# Patient Record
Sex: FEMALE | Race: BLACK OR AFRICAN AMERICAN | NOT HISPANIC OR LATINO
[De-identification: names, ages, dates, MRNs, and addresses within clinical notes are randomized per-mention and may not be internally consistent; named-entity substitution may affect disease eponyms.]

---

## 2022-04-06 ENCOUNTER — APPOINTMENT (OUTPATIENT)
Dept: ORTHOPEDIC SURGERY | Facility: CLINIC | Age: 52
End: 2022-04-06
Payer: OTHER MISCELLANEOUS

## 2022-04-06 VITALS — WEIGHT: 160 LBS | HEIGHT: 64 IN | BODY MASS INDEX: 27.31 KG/M2

## 2022-04-06 PROBLEM — Z00.00 ENCOUNTER FOR PREVENTIVE HEALTH EXAMINATION: Status: ACTIVE | Noted: 2022-04-06

## 2022-04-06 PROCEDURE — 99213 OFFICE O/P EST LOW 20 MIN: CPT

## 2022-04-06 PROCEDURE — 99072 ADDL SUPL MATRL&STAF TM PHE: CPT
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
## 2022-04-06 NOTE — WORK
[Total] : total [Does not reveal pre-existing condition(s) that may affect treatment/prognosis] : does not reveal pre-existing condition(s) that may affect treatment/prognosis [Cannot return to work because ________] : cannot return to work because [unfilled] [15+ days] : 15+ days [Patient] : patient [No Rx restrictions] : No Rx restrictions. [I provided the services listed above] :  I provided the services listed above.

## 2022-04-06 NOTE — REASON FOR VISIT
[FreeTextEntry2] : pt is here for wc fu of the left shoulder. the pain in the left shoulder comes and goes. pt is doing pt which has been helping a lot. pt has  better  range of motion in the left shoulder  than before.

## 2022-04-06 NOTE — PHYSICAL EXAM
[Left] : left shoulder [4 ___] : forward flexion 4[unfilled]/5 [4___] : external rotation 4[unfilled]/5 [] : motor and sensory intact distally [FreeTextEntry9] : FE: L 140A, 150P\par ER: L 30\par IR: L L1

## 2022-04-06 NOTE — ASSESSMENT
[FreeTextEntry1] : L adhesive capsulitis with mild GH DJD.\par MRI L shoulder - supra tendonitis, no RCT\par Continue PT, HEP, she is improving. \par Mobic prn.\par Will consider GH injection.\par OOW.\par Follow up in 6 weeks.

## 2022-04-19 ENCOUNTER — APPOINTMENT (OUTPATIENT)
Dept: ORTHOPEDIC SURGERY | Facility: CLINIC | Age: 52
End: 2022-04-19
Payer: OTHER MISCELLANEOUS

## 2022-04-19 VITALS — BODY MASS INDEX: 27.31 KG/M2 | HEIGHT: 64 IN | WEIGHT: 160 LBS

## 2022-04-19 DIAGNOSIS — Z78.9 OTHER SPECIFIED HEALTH STATUS: ICD-10-CM

## 2022-04-19 PROCEDURE — 99214 OFFICE O/P EST MOD 30 MIN: CPT

## 2022-04-19 PROCEDURE — 99072 ADDL SUPL MATRL&STAF TM PHE: CPT

## 2022-04-19 NOTE — ASSESSMENT
[FreeTextEntry1] : Has limited L shoulder ROM and pain on exam, will continue with Dr. Perez for treatment.\par Does have some radicular symptoms cervical as well, Consult with Pain for Sharita and LESI.\par Will continue formal PT x 6 weeks. \par Pt will continue PRN Diclofenac bid for discomfort.\par pt is oow until further notice.\par rtc 2 months
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
## 2022-04-19 NOTE — IMAGING
[de-identified] : Left Shoulder: Palpation of the shoulder/upper arm is as follows: no tenderness to palpation. Strength of the shoulder is as follows: There is decent strength.. Ligament Stability and Special Tests of the shoulder is as follows: Impingement testing is positive. \par \par Neck: \par \par Inspection of the cervical spine is as follows: no scars, no erythema, no ecchymosis, no masses and no rashes. Palpation of the cervical spine is as follows: no trapezial spasm, no trapezial tenderness, no rhomboid spasm, no rhomboid tenderness, no paracervical spasm and no paracervical tenderness. Range of motion of the cervical spine is as follows: ROM is limited in extension Strength Testing for the cervical spine is as follows: Left Deltoid strength 5-/5. Right Deltoid strength 5/5, Left Biceps 5/5, Right Biceps 5/5, Left Triceps 5/5, Right Triceps 5/5, Left Wrist Flexors 5/5, Right Wrist Flexors 5/5, Left Finger Abductors 5/5, Right Finger Abductors 5/5, Left Grasp 5/5 and Right Grasp 5/5 Neurological testing for the cervical spine is as follows: light touch is intact throughout both upper extremities \par \par Back, including spine: Palpation of the thoracic/lumbar spine is as follows: no lumbar paraspinal spasm and no lumbar paraspinal tenderness. Range of motion of the thoracic and lumbar spine is as follows: full range of motion with mild stiffness. Strength Testing of the thoracic and lumbar spine is as follows: motor exam is non-focal throughout both lower extremities Special testing of the thoracic and lumbar spine is as follows: negative sitting straight leg raise on right and negative sitting straight leg raise on left Neurological testing of the thoracic and lumbar spine is as follows: sensory exam is non-focal throughout both lower extremities Gait and function is as follows: non-antalgic gait and patient ambulates without assistive device. Pt with normal tandem, heel and toe walking.  \par \par General: \par \par The percentage of impairment is total. Prognosis for recovery is guarded The patients PMHx does not reveal pre-existing condition(s) that may affect treatment/prognosis. The patient cannot return to work. Limitations include sitting. The patients return to work/limitations will be discussed with with patient. No Rx restrictions. Board authorized health care provider. I provided the services listed above.

## 2022-05-18 ENCOUNTER — APPOINTMENT (OUTPATIENT)
Dept: ORTHOPEDIC SURGERY | Facility: CLINIC | Age: 52
End: 2022-05-18
Payer: OTHER MISCELLANEOUS

## 2022-05-18 VITALS — HEIGHT: 64 IN | WEIGHT: 160 LBS | BODY MASS INDEX: 27.31 KG/M2

## 2022-05-18 PROCEDURE — 99072 ADDL SUPL MATRL&STAF TM PHE: CPT

## 2022-05-18 PROCEDURE — 99213 OFFICE O/P EST LOW 20 MIN: CPT
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
## 2022-05-18 NOTE — PHYSICAL EXAM
[Left] : left shoulder [4 ___] : forward flexion 4[unfilled]/5 [4___] : external rotation 4[unfilled]/5 [] : no deformity [FreeTextEntry9] : FE: L 140A, 150P\par ER: L 30\par IR: L L1

## 2022-05-18 NOTE — ASSESSMENT
[FreeTextEntry1] : L adhesive capsulitis with mild GH DJD.\par MRI L shoulder - supra tendonitis, no RCT.\par Continue PT, HEP,which is helping.  \par Mobic prn.\par Will consider GH injection.\par Dr. Costa has her OOW.\par Follow up in 6 weeks.

## 2022-05-31 ENCOUNTER — APPOINTMENT (OUTPATIENT)
Dept: ORTHOPEDIC SURGERY | Facility: CLINIC | Age: 52
End: 2022-05-31
Payer: OTHER MISCELLANEOUS

## 2022-05-31 VITALS — HEIGHT: 64 IN | BODY MASS INDEX: 27.31 KG/M2 | WEIGHT: 160 LBS

## 2022-05-31 PROCEDURE — 99213 OFFICE O/P EST LOW 20 MIN: CPT

## 2022-05-31 PROCEDURE — 99072 ADDL SUPL MATRL&STAF TM PHE: CPT

## 2022-05-31 NOTE — IMAGING
[de-identified] : Left Shoulder: Palpation of the shoulder/upper arm is as follows: no tenderness to palpation. Strength of the shoulder is as follows: There is decent strength.. Ligament Stability and Special Tests of the shoulder is as follows: Impingement testing is positive. \par \par Neck: \par \par Inspection of the cervical spine is as follows: no scars, no erythema, no ecchymosis, no masses and no rashes. Palpation of the cervical spine is as follows: no trapezial spasm, no trapezial tenderness, no rhomboid spasm, no rhomboid tenderness, no paracervical spasm and no paracervical tenderness. Range of motion of the cervical spine is as follows: ROM is full with stiffness\par Strength Testing for the cervical spine is as follows: Left Deltoid strength 5-/5. Right Deltoid strength 5/5, Left Biceps 5/5, Right Biceps 5/5, Left Triceps 5/5, Right Triceps 5/5, Left Wrist Flexors 5/5, Right Wrist Flexors 5/5, Left Finger Abductors 5/5, Right Finger Abductors 5/5, Left Grasp 5/5 and Right Grasp 5/5 Neurological testing for the cervical spine is as follows: light touch is intact throughout both upper extremities \par \par Back, including spine: Palpation of the thoracic/lumbar spine is as follows: no lumbar paraspinal spasm and no lumbar paraspinal tenderness. Range of motion of the thoracic and lumbar spine is as follows: full range of motion with mild stiffness. Strength Testing of the thoracic and lumbar spine is as follows: motor exam is non-focal throughout both lower extremities Special testing of the thoracic and lumbar spine is as follows: negative sitting straight leg raise on right and negative sitting straight leg raise on left Neurological testing of the thoracic and lumbar spine is as follows: sensory exam is non-focal throughout both lower extremities Gait and function is as follows: non-antalgic gait and patient ambulates without assistive device. Pt with normal tandem, heel and toe walking.  \par \par General: \par \par The percentage of impairment is total. Prognosis for recovery is guarded The patients PMHx does not reveal pre-existing condition(s) that may affect treatment/prognosis. The patient cannot return to work. Limitations include sitting. The patients return to work/limitations will be discussed with with patient. No Rx restrictions. Board authorized health care provider. I provided the services listed above.
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
## 2022-05-31 NOTE — ASSESSMENT
[FreeTextEntry1] : Has limited L shoulder ROM and pain on exam, will continue with Dr. Perez for treatment.\par Does have some radicular symptoms cervical as well, Consult with Pain for Sharita and LESI.\par Will continue formal PT x 6 weeks. \par Pt will continue PRN Diclofenac bid for discomfort.\par pt is oow until further notice.\par rtc 2 months\par \par NSAIDs- Patient warned of risk of medication to GI tract, increased blood pressure, cardiac risk, and risk of fluid retention.  Advised to clear medication with internist or PCP if any concurrent health problem with heart, blood pressure, or GI system exists.

## 2022-07-13 ENCOUNTER — APPOINTMENT (OUTPATIENT)
Dept: ORTHOPEDIC SURGERY | Facility: CLINIC | Age: 52
End: 2022-07-13

## 2022-07-13 VITALS — BODY MASS INDEX: 27.31 KG/M2 | HEIGHT: 64 IN | WEIGHT: 160 LBS

## 2022-07-13 PROCEDURE — 99072 ADDL SUPL MATRL&STAF TM PHE: CPT

## 2022-07-13 PROCEDURE — 99213 OFFICE O/P EST LOW 20 MIN: CPT

## 2022-07-13 RX ORDER — FLUTICASONE PROPIONATE 50 UG/1
50 SPRAY, METERED NASAL
Qty: 16 | Refills: 0 | Status: ACTIVE | COMMUNITY
Start: 2022-03-09

## 2022-07-13 RX ORDER — AMOXICILLIN 500 MG/1
500 CAPSULE ORAL
Qty: 30 | Refills: 0 | Status: ACTIVE | COMMUNITY
Start: 2022-02-09

## 2022-07-13 NOTE — PHYSICAL EXAM
[Left] : left shoulder [4 ___] : forward flexion 4[unfilled]/5 [5___] : external rotation 5[unfilled]/5 [] : no deformity [FreeTextEntry9] : FE: L 140A, 150P\par ER: L 30\par IR: L L1
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
## 2022-07-13 NOTE — WORK
[Total] : total [Does not reveal pre-existing condition(s) that may affect treatment/prognosis] : does not reveal pre-existing condition(s) that may affect treatment/prognosis [Cannot return to work because ________] : cannot return to work because [unfilled] [15+ days] : 15+ days [Patient] : patient [No Rx restrictions] : No Rx restrictions. [I provided the services listed above] :  I provided the services listed above. [Sprain/Strain] : sprain/strain [Was the competent medical cause of the injury] : was the competent medical cause of the injury [Are consistent with the injury] : are consistent with the injury [Consistent with my objective findings] : consistent with my objective findings

## 2022-07-25 ENCOUNTER — APPOINTMENT (OUTPATIENT)
Dept: ORTHOPEDIC SURGERY | Facility: CLINIC | Age: 52
End: 2022-07-25

## 2022-08-24 ENCOUNTER — APPOINTMENT (OUTPATIENT)
Dept: ORTHOPEDIC SURGERY | Facility: CLINIC | Age: 52
End: 2022-08-24

## 2022-08-24 VITALS — BODY MASS INDEX: 27.31 KG/M2 | HEIGHT: 64 IN | WEIGHT: 160 LBS

## 2022-08-24 PROCEDURE — 99213 OFFICE O/P EST LOW 20 MIN: CPT

## 2022-08-24 PROCEDURE — 99072 ADDL SUPL MATRL&STAF TM PHE: CPT

## 2022-08-24 NOTE — WORK
[Sprain/Strain] : sprain/strain [Was the competent medical cause of the injury] : was the competent medical cause of the injury [Are consistent with the injury] : are consistent with the injury [Consistent with my objective findings] : consistent with my objective findings [Total] : total [Does not reveal pre-existing condition(s) that may affect treatment/prognosis] : does not reveal pre-existing condition(s) that may affect treatment/prognosis [Cannot return to work because ________] : cannot return to work because [unfilled] [15+ days] : 15+ days [Patient] : patient [No Rx restrictions] : No Rx restrictions. [I provided the services listed above] :  I provided the services listed above.
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
## 2022-08-24 NOTE — PHYSICAL EXAM
[Left] : left shoulder [4 ___] : forward flexion 4[unfilled]/5 [5___] : external rotation 5[unfilled]/5 [] : no deformity [FreeTextEntry9] : FE: L 150A\par ER: L 35\par IR: L L1

## 2022-10-04 ENCOUNTER — APPOINTMENT (OUTPATIENT)
Dept: ORTHOPEDIC SURGERY | Facility: CLINIC | Age: 52
End: 2022-10-04

## 2022-10-04 PROCEDURE — 99072 ADDL SUPL MATRL&STAF TM PHE: CPT

## 2022-10-04 PROCEDURE — 99214 OFFICE O/P EST MOD 30 MIN: CPT

## 2022-10-04 NOTE — IMAGING
[de-identified] : Left Shoulder: Palpation of the shoulder/upper arm is as follows: no tenderness to palpation. Strength of the shoulder is as follows: There is decent strength.. Ligament Stability and Special Tests of the shoulder is as follows: Impingement testing is positive. \par \par Neck: \par \par Inspection of the cervical spine is as follows: no erythema\par Palpation of the cervical spine is as follows: no trapezial spasm, no trapezial tenderness, no rhomboid spasm, no rhomboid tenderness, no paracervical spasm and no paracervical tenderness. Range of motion of the cervical spine is as follows: ROM is full with stiffness\par Strength Testing for the cervical spine is as follows: Left Deltoid strength 5/5. Right Deltoid strength 5/5, Left Biceps 5/5, Right Biceps 5/5, Left Triceps 5/5, Right Triceps 5/5, Left Wrist Flexors 5/5, Right Wrist Flexors 5/5, Left Finger Abductors 5/5, Right Finger Abductors 5/5, Left Grasp 5/5 and Right Grasp 5/5 Neurological testing for the cervical spine is as follows: light touch is intact throughout both upper extremities \par Navarro+ right, negative on left\par -b/l carpal compression testing\par \par Back, including spine: Palpation of the thoracic/lumbar spine is as follows: no lumbar paraspinal spasm and no lumbar paraspinal tenderness. Range of motion of the thoracic and lumbar spine is as follows: full range of motion with mild stiffness. Strength Testing of the thoracic and lumbar spine is as follows: motor exam is non-focal throughout both lower extremities \par Special testing of the thoracic and lumbar spine is as follows: negative sitting straight leg raise on right and negative sitting straight leg raise on left Neurological testing of the thoracic and lumbar spine is as follows: sensory exam is non-focal throughout both lower extremities Gait and function is as follows: non-antalgic gait and patient ambulates without assistive device. Pt with normal tandem gait\par \par General: \par \par The percentage of impairment is total for her job specific tasks. Prognosis for recovery is guarded The patients PMHx does not reveal pre-existing condition(s) that may affect treatment/prognosis. The patient can return to work. Limitations include lifting, pushing, pulling > 15 lbs. The patients return to work/limitations will be discussed with with patient. No Rx restrictions. Board authorized health care provider. I provided the services listed above.

## 2022-10-04 NOTE — ASSESSMENT
[FreeTextEntry1] : Has limited L shoulder ROM and pain on exam, will continue with Dr. Perez for treatment.\par Does have some radicular symptoms cervical as well, discussed Pain for Sharita and LESI.\par Will continue formal PT \par Pt will continue PRN Diclofenac bid for discomfort.\par Has made progress with PT, will send to back with protections for neck/back\par rtc 2 months\par \par NSAIDs- Patient warned of risk of medication to GI tract, increased blood pressure, cardiac risk, and risk of fluid retention.  Advised to clear medication with internist or PCP if any concurrent health problem with heart, blood pressure, or GI system exists.

## 2022-10-04 NOTE — RETURN TO WORK/SCHOOL
[Return Date: _____] : as of [unfilled].  This has been discussed in detail with ~Tayler~ and ~he/she~ understands this. [Light Duty] : light duty [___ Months] : I will re-evaluate the patient in [unfilled] month(s), at which time I will provide an update to their current status [FreeTextEntry1] : No lifting, pushing, pulling > 15 lbs.
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
## 2022-10-05 ENCOUNTER — APPOINTMENT (OUTPATIENT)
Dept: ORTHOPEDIC SURGERY | Facility: CLINIC | Age: 52
End: 2022-10-05

## 2022-10-05 VITALS — BODY MASS INDEX: 27.31 KG/M2 | HEIGHT: 64 IN | WEIGHT: 160 LBS

## 2022-10-05 PROCEDURE — 99213 OFFICE O/P EST LOW 20 MIN: CPT

## 2022-10-05 PROCEDURE — 99072 ADDL SUPL MATRL&STAF TM PHE: CPT

## 2022-10-05 NOTE — ASSESSMENT
[FreeTextEntry1] : L adhesive capsulitis with mild GH DJD.\par MRI L shoulder - supra tendonitis, no RCT.\par Continue PT, HEP,which is helping.  \par Mobic prn.\par Will consider GH injection.\par Dr. Costa has her light duty \par Follow up in 6 weeks.

## 2022-10-05 NOTE — WORK
[Sprain/Strain] : sprain/strain [Was the competent medical cause of the injury] : was the competent medical cause of the injury [Are consistent with the injury] : are consistent with the injury [Consistent with my objective findings] : consistent with my objective findings [Does not reveal pre-existing condition(s) that may affect treatment/prognosis] : does not reveal pre-existing condition(s) that may affect treatment/prognosis [15+ days] : 15+ days [Patient] : patient [No Rx restrictions] : No Rx restrictions. [I provided the services listed above] :  I provided the services listed above. [Partial] : partial [Can return to work with limitations on ______] : can return to work with limitations on [unfilled]
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
## 2022-11-16 ENCOUNTER — APPOINTMENT (OUTPATIENT)
Dept: ORTHOPEDIC SURGERY | Facility: CLINIC | Age: 52
End: 2022-11-16

## 2022-11-16 VITALS — BODY MASS INDEX: 27.31 KG/M2 | WEIGHT: 160 LBS | HEIGHT: 64 IN

## 2022-11-16 PROCEDURE — 99072 ADDL SUPL MATRL&STAF TM PHE: CPT

## 2022-11-16 PROCEDURE — 99213 OFFICE O/P EST LOW 20 MIN: CPT

## 2022-11-16 NOTE — WORK
[Sprain/Strain] : sprain/strain [Was the competent medical cause of the injury] : was the competent medical cause of the injury [Are consistent with the injury] : are consistent with the injury [Consistent with my objective findings] : consistent with my objective findings [Partial] : partial [Does not reveal pre-existing condition(s) that may affect treatment/prognosis] : does not reveal pre-existing condition(s) that may affect treatment/prognosis [Can return to work with limitations on ______] : can return to work with limitations on [unfilled] [15+ days] : 15+ days [Patient] : patient [No Rx restrictions] : No Rx restrictions. [I provided the services listed above] :  I provided the services listed above.
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
## 2022-11-16 NOTE — ASSESSMENT
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

## 2022-11-16 NOTE — PHYSICAL EXAM
[Left] : left shoulder [5___] : external rotation 5[unfilled]/5 [5 ___] : forward flexion 5[unfilled]/5 [] : negative impingement testing [FreeTextEntry9] : FE: L 160A\par ER: L 40\par IR: L L1

## 2022-11-29 ENCOUNTER — APPOINTMENT (OUTPATIENT)
Dept: ORTHOPEDIC SURGERY | Facility: CLINIC | Age: 52
End: 2022-11-29

## 2022-11-29 DIAGNOSIS — M54.12 RADICULOPATHY, CERVICAL REGION: ICD-10-CM

## 2022-11-29 DIAGNOSIS — M62.838 OTHER MUSCLE SPASM: ICD-10-CM

## 2022-11-29 DIAGNOSIS — M62.830 MUSCLE SPASM OF BACK: ICD-10-CM

## 2022-11-29 DIAGNOSIS — M54.16 RADICULOPATHY, LUMBAR REGION: ICD-10-CM

## 2022-11-29 DIAGNOSIS — M51.26 OTHER INTERVERTEBRAL DISC DISPLACEMENT, LUMBAR REGION: ICD-10-CM

## 2022-11-29 PROCEDURE — 99213 OFFICE O/P EST LOW 20 MIN: CPT

## 2022-11-29 PROCEDURE — 99072 ADDL SUPL MATRL&STAF TM PHE: CPT

## 2022-11-29 NOTE — IMAGING
[de-identified] : Left Shoulder: Palpation of the shoulder/upper arm is as follows: no tenderness to palpation. Strength of the shoulder is as follows: There is decent strength.. Ligament Stability and Special Tests of the shoulder is as follows: Impingement testing is positive. \par \par Neck: \par \par Inspection of the cervical spine is as follows: no erythema\par Palpation of the cervical spine is as follows: no trapezial spasm, no trapezial tenderness, no rhomboid spasm, no rhomboid tenderness, no paracervical spasm and no paracervical tenderness. Range of motion of the cervical spine is as follows: ROM is full with stiffness\par Strength Testing for the cervical spine is as follows: Left Deltoid strength 5/5. Right Deltoid strength 5/5, Left Biceps 5/5, Right Biceps 5/5, Left Triceps 5/5, Right Triceps 5/5, Left Wrist Flexors 5/5, Right Wrist Flexors 5/5, Left Finger Abductors 5/5, Right Finger Abductors 5/5, Left Grasp 5/5 and Right Grasp 5/5 Neurological testing for the cervical spine is as follows: light touch is intact throughout both upper extremities \par Navarro+ right, negative on left\par -b/l carpal compression testing\par \par Back, including spine: Palpation of the thoracic/lumbar spine is as follows: no lumbar paraspinal spasm and no lumbar paraspinal tenderness. Range of motion of the thoracic and lumbar spine is as follows: full range of motion with mild stiffness. Strength Testing of the thoracic and lumbar spine is as follows: motor exam is non-focal throughout both lower extremities \par Special testing of the thoracic and lumbar spine is as follows: negative sitting straight leg raise on right and negative sitting straight leg raise on left Neurological testing of the thoracic and lumbar spine is as follows: sensory exam is non-focal throughout both lower extremities Gait and function is as follows: non-antalgic gait and patient ambulates without assistive device. Pt with normal tandem gait\par \par General: \par \par The percentage of impairment is partial. Prognosis for recovery is fair. The patients PMHx does not reveal pre-existing condition(s) that may affect treatment/prognosis. The patient can return to work. Limitations include lifting, pushing, pulling > 15 lbs. The patients return to work/limitations will be discussed with with patient. No Rx restrictions. Board authorized health care provider. I provided the services listed above.
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
## 2022-11-29 NOTE — ASSESSMENT
[FreeTextEntry1] : Has limited L shoulder ROM and pain on exam, will continue with Dr. Perez for treatment.\par Does have some radicular symptoms cervical as well, discussed Pain for Sharita and LESI.\par Will continue formal PT \par Pt will continue PRN Diclofenac bid for discomfort.\par Has made progress with PT, will send to back with protections for neck/back\par rtc 2 months\par \par NSAIDs- Patient warned of risk of medication to GI tract, increased blood pressure, cardiac risk, and risk of fluid retention.  Advised to clear medication with internist or PCP if any concurrent health problem with heart, blood pressure, or GI system exists. \par \par Patient seen by Michela Ridley PA-C,  under the supervision of Dr. Jaime Costa M.D.\par

## 2023-02-01 ENCOUNTER — APPOINTMENT (OUTPATIENT)
Dept: ORTHOPEDIC SURGERY | Facility: CLINIC | Age: 53
End: 2023-02-01
Payer: OTHER MISCELLANEOUS

## 2023-02-01 VITALS — HEIGHT: 64 IN | BODY MASS INDEX: 27.31 KG/M2 | WEIGHT: 160 LBS

## 2023-02-01 DIAGNOSIS — M75.02 ADHESIVE CAPSULITIS OF LEFT SHOULDER: ICD-10-CM

## 2023-02-01 DIAGNOSIS — M19.012 PRIMARY OSTEOARTHRITIS, LEFT SHOULDER: ICD-10-CM

## 2023-02-01 PROCEDURE — 99072 ADDL SUPL MATRL&STAF TM PHE: CPT

## 2023-02-01 PROCEDURE — 99455 WORK RELATED DISABILITY EXAM: CPT
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
## 2023-02-01 NOTE — WORK
[Has the patient reached Maximum Medical Improvement? If yes, indicate date___] : Yes, on [unfilled] [Is there permanent partial impairment?] : Yes [Left] : left [Yes] : Yes [FreeTextEntry7] : shoulder [FreeTextEntry5] : 10 [de-identified] : FE      R 150   L 145 145 145\par ABD   R 160   L 145 150  150 - table 5.4a: 10% half of mild\par ADD   R 50     L 45 45 45\par PE      R 55     L 50 50 55 [Not working] : Not working [Could this patient perform any work activities with or without restrictions? Explain below.] : Yes [Has the patient had an injury/illness since the date of injury which impacts residual functional capacity?] : No [Would the patient benefit from vocational rehabilitation? If Yes, explain below.] :  No [de-identified] : No restrictions with regards to her shoulders

## 2023-02-01 NOTE — ASSESSMENT
[FreeTextEntry1] : L adhesive capsulitis with mild GH DJD.\par MRI L shoulder - supra tendonitis, no RCT.\par Completed PT.\par Will continue HEP.\par Mobic prn.\par Dr. Costa has her light duty \par RTO prn.

## 2023-02-08 ENCOUNTER — APPOINTMENT (OUTPATIENT)
Dept: ORTHOPEDIC SURGERY | Facility: CLINIC | Age: 53
End: 2023-02-08